# Patient Record
Sex: FEMALE | Race: BLACK OR AFRICAN AMERICAN | Employment: FULL TIME | URBAN - METROPOLITAN AREA
[De-identification: names, ages, dates, MRNs, and addresses within clinical notes are randomized per-mention and may not be internally consistent; named-entity substitution may affect disease eponyms.]

---

## 2022-08-25 ENCOUNTER — OFFICE VISIT (OUTPATIENT)
Dept: ORTHOPEDIC SURGERY | Age: 67
End: 2022-08-25
Payer: MEDICARE

## 2022-08-25 VITALS — WEIGHT: 171 LBS | BODY MASS INDEX: 31.28 KG/M2

## 2022-08-25 DIAGNOSIS — S43.491A SPRAIN OF OTHER PART OF RIGHT SHOULDER REGION, INITIAL ENCOUNTER: Primary | ICD-10-CM

## 2022-08-25 DIAGNOSIS — S42.002S CLOSED NONDISPLACED FRACTURE OF LEFT CLAVICLE, UNSPECIFIED PART OF CLAVICLE, SEQUELA: ICD-10-CM

## 2022-08-25 PROCEDURE — G8400 PT W/DXA NO RESULTS DOC: HCPCS | Performed by: ORTHOPAEDIC SURGERY

## 2022-08-25 PROCEDURE — 99203 OFFICE O/P NEW LOW 30 MIN: CPT | Performed by: ORTHOPAEDIC SURGERY

## 2022-08-25 PROCEDURE — 1090F PRES/ABSN URINE INCON ASSESS: CPT | Performed by: ORTHOPAEDIC SURGERY

## 2022-08-25 PROCEDURE — 3017F COLORECTAL CA SCREEN DOC REV: CPT | Performed by: ORTHOPAEDIC SURGERY

## 2022-08-25 PROCEDURE — G8536 NO DOC ELDER MAL SCRN: HCPCS | Performed by: ORTHOPAEDIC SURGERY

## 2022-08-25 PROCEDURE — G8427 DOCREV CUR MEDS BY ELIG CLIN: HCPCS | Performed by: ORTHOPAEDIC SURGERY

## 2022-08-25 PROCEDURE — G8432 DEP SCR NOT DOC, RNG: HCPCS | Performed by: ORTHOPAEDIC SURGERY

## 2022-08-25 PROCEDURE — 1123F ACP DISCUSS/DSCN MKR DOCD: CPT | Performed by: ORTHOPAEDIC SURGERY

## 2022-08-25 PROCEDURE — 1101F PT FALLS ASSESS-DOCD LE1/YR: CPT | Performed by: ORTHOPAEDIC SURGERY

## 2022-08-25 PROCEDURE — G8419 CALC BMI OUT NRM PARAM NOF/U: HCPCS | Performed by: ORTHOPAEDIC SURGERY

## 2022-08-25 NOTE — LETTER
8/25/2022    Patient: Lizette Foss   YOB: 1955   Date of Visit: 8/25/2022     Coco Cline MD  [address] [address]  Via Fax: [phone]    Dear Coco Cline MD,      Thank you for referring Ms. Lupis Mcgee to [facility] for evaluation. My notes for this consultation are attached. If you have questions, please do not hesitate to call me. I look forward to following your patient along with you.       Sincerely,    Little Olson MD

## 2022-08-25 NOTE — PROGRESS NOTES
Samm Chand (: 1955) is a 73 y.o. female, patient, here for evaluation of the following chief complaint(s):  Knee Pain and Shoulder Pain (Patient here for right sholder injury after falling down some steps in the middle of the night and hitting her shoulder onto the rail. She went to [facility] and had x rays.)       HPI:    Patient presents the office today with a chief plaint of right shoulder pain. Patient states she got up in the middle the night and stumbled and hit her arm into the wall of her stairwell. She did not develop any black and blue. This is about a week ago. She states most of her pain is resolved. She is not had a prior problem with the shoulder. She went to the local emergency room and had an x-ray performed. She is currently using a sling. No Known Allergies    Current Outpatient Medications   Medication Sig    oxyCODONE-acetaminophen (PERCOCET) 5-325 mg per tablet Take 1 Tab by mouth every four (4) hours as needed for Pain. ibuprofen (MOTRIN) 800 mg tablet Take 1 Tab by mouth two (2) times a day. esomeprazole (NEXIUM) 40 mg capsule Take 40 mg by mouth daily. bisoprolol-hydrochlorothiazide (ZIAC) 5-6.25 mg per tablet Take 1 Tab by mouth daily. black cohosh 540 mg cap Take 1 Cap by mouth daily. No current facility-administered medications for this visit. Past Medical History:   Diagnosis Date    Arthritis     Hypertension         Past Surgical History:   Procedure Laterality Date    HX GYN  partial hysterectomy    HX HEENT Left eye        HX ORTHOPAEDIC Bilateral bunionectomy    HX OTHER SURGICAL  tonsillectomy       No family history on file.      Social History     Socioeconomic History    Marital status:      Spouse name: Not on file    Number of children: Not on file    Years of education: Not on file    Highest education level: Not on file   Occupational History    Not on file   Tobacco Use    Smoking status: Never    Smokeless tobacco: Not on file   Substance and Sexual Activity    Alcohol use: Yes     Alcohol/week: 0.8 standard drinks     Types: 1 Glasses of wine per week     Comment: occasional    Drug use: No    Sexual activity: Not on file   Other Topics Concern    Not on file   Social History Narrative    Not on file     Social Determinants of Health     Financial Resource Strain: Not on file   Food Insecurity: Not on file   Transportation Needs: Not on file   Physical Activity: Not on file   Stress: Not on file   Social Connections: Not on file   Intimate Partner Violence: Not on file   Housing Stability: Not on file       Review of Systems   Musculoskeletal:         Right shoulder injury     Vitals: Wt 171 lb (77.6 kg)   BMI 31.28 kg/m²    Body mass index is 31.28 kg/m². Ortho Exam     Patient is alert and oriented x3. Patient is in no acute distress. Patient ambulates with a nonantalgic gait. Right shoulder: There is no effusion noted. There is no ecchymosis noted. She has 125 degrees of forward elevation, 80 degrees of lateral duction 60 degrees of external rotation. She has normal strength although painful throughout. There is no swelling noted distally. Neurovascular examination is intact. Left Shoulder:  No shoulder girdle atrophy . There is no soft tissue swelling, ecchymosis, abrasions or lacerations. Active range of motion is full with forward flexion, lateral abduction and external rotation. Internal rotation is to the upper lumbar level with a negative lift-off sign. Passive range of motion is full with a negative impingement sign and a negative Che sign. Rotator cuff strength with forward flexion, lateral abduction and external rotation is intact with 5/5 strength. There is no crepitation about the joint. Palpation of the [facility] joint does not reproduce discomfort, and there is no pain elicited with cross-body adduction. Strength of the extremity is 5/5 at biceps/triceps/wrist extension. DRT's are intact at +2/4 and  symmetrical.  Cervical range of motion is full with no pain to palpation along the paraspinal musculature medial border of the scapula. Spurling's sign is negative. X-ray evaluation reveals osteopenia. I do not appreciate any evidence of fracture but there is evidence of osteoarthritis of the [facility] joint as well as the glenohumeral joint. ASSESSMENT/PLAN:    I have gone over the findings with the patient. Over 50% of her pain has been reduced. There is no ecchymosis on her shoulder. She very well could have done some damage to the rotator cuff. However at this stage with her improvement I think we give this a little bit more time and see what happens. I did recommend physical therapy. She agrees with this.   I would like her to return to the office in 4 weeks if she has no improvement or little improvement        Ashley Vizcarra MD